# Patient Record
(demographics unavailable — no encounter records)

---

## 2025-05-08 NOTE — PHYSICAL EXAM
[Normal Sclera/Conjunctiva] : normal sclera/conjunctiva [No JVD] : no jugular venous distention [No Respiratory Distress] : no respiratory distress  [Normal Rate] : normal rate  [No Edema] : there was no peripheral edema [Soft] : abdomen soft [Non Tender] : non-tender [Non-distended] : non-distended [Normal Bowel Sounds] : normal bowel sounds [Normal] : affect was normal and insight and judgment were intact

## 2025-05-13 NOTE — HISTORY OF PRESENT ILLNESS
[de-identified] : 57F with PMHx of HTN and depression, undiagnosed schizophrenia here for follow up for bp. She wants a refill for her lisinopril 10mg daily. She is tangential- discussing trafficking, pharmaceuticals, private properties, family missing. Adherent with lisinopril.

## 2025-05-13 NOTE — HISTORY OF PRESENT ILLNESS
[de-identified] : 57F with PMHx of HTN and depression, undiagnosed schizophrenia here for follow up for bp. She wants a refill for her lisinopril 10mg daily. She is tangential- discussing trafficking, pharmaceuticals, private properties, family missing. Adherent with lisinopril.